# Patient Record
Sex: FEMALE | Race: WHITE | NOT HISPANIC OR LATINO | Employment: FULL TIME | URBAN - METROPOLITAN AREA
[De-identification: names, ages, dates, MRNs, and addresses within clinical notes are randomized per-mention and may not be internally consistent; named-entity substitution may affect disease eponyms.]

---
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
## 2024-02-15 ENCOUNTER — INITIAL PRENATAL (OUTPATIENT)
Dept: OBSTETRICS AND GYNECOLOGY | Facility: CLINIC | Age: 19
End: 2024-02-15
Payer: COMMERCIAL

## 2024-02-15 VITALS — WEIGHT: 158 LBS | SYSTOLIC BLOOD PRESSURE: 100 MMHG | BODY MASS INDEX: 25.5 KG/M2 | DIASTOLIC BLOOD PRESSURE: 60 MMHG

## 2024-02-15 DIAGNOSIS — O09.892 SHORT INTERVAL BETWEEN PREGNANCIES AFFECTING PREGNANCY IN SECOND TRIMESTER, ANTEPARTUM: ICD-10-CM

## 2024-02-15 DIAGNOSIS — Z34.82 ENCOUNTER FOR SUPERVISION OF OTHER NORMAL PREGNANCY IN SECOND TRIMESTER: Primary | ICD-10-CM

## 2024-02-15 DIAGNOSIS — O36.80X0 ENCOUNTER TO DETERMINE FETAL VIABILITY OF PREGNANCY, SINGLE OR UNSPECIFIED FETUS: ICD-10-CM

## 2024-02-15 RX ORDER — PNV NO.95/FERROUS FUM/FOLIC AC 28MG-0.8MG
1 TABLET ORAL DAILY
Qty: 90 TABLET | Refills: 3 | Status: SHIPPED | OUTPATIENT
Start: 2024-02-15

## 2024-02-20 LAB
ABO GROUP BLD: NORMAL
AMPHETAMINES UR QL SCN: NEGATIVE NG/ML
BARBITURATES UR QL SCN: NEGATIVE NG/ML
BASOPHILS # BLD AUTO: 0 X10E3/UL (ref 0–0.2)
BASOPHILS NFR BLD AUTO: 1 %
BENZODIAZ UR QL SCN: NEGATIVE NG/ML
BLD GP AB SCN SERPL QL: NEGATIVE
BZE UR QL SCN: NEGATIVE NG/ML
C TRACH RRNA SPEC QL NAA+PROBE: NEGATIVE
CANNABINOIDS UR QL SCN: NEGATIVE NG/ML
CFDNA.FET/CFDNA.TOTAL SFR FETUS: NORMAL %
CITATION REF LAB TEST: NORMAL
CREAT UR-MCNC: 191.6 MG/DL (ref 20–300)
EOSINOPHIL # BLD AUTO: 0 X10E3/UL (ref 0–0.4)
EOSINOPHIL NFR BLD AUTO: 1 %
ERYTHROCYTE [DISTWIDTH] IN BLOOD BY AUTOMATED COUNT: 14.4 % (ref 11.7–15.4)
FET 13+18+21+X+Y ANEUP PLAS.CFDNA: NEGATIVE
FET CHR 21 TS PLAS.CFDNA QL: NEGATIVE
FET MS X RISK WBC.DNA+CFDNA QL: NOT DETECTED
FET SEX PLAS.CFDNA DOSAGE CFDNA: NORMAL
FET TS 13 RISK PLAS.CFDNA QL: NEGATIVE
FET TS 18 RISK WBC.DNA+CFDNA QL: NEGATIVE
FET X + Y ANEUP RISK PLAS.CFDNA SEQ-IMP: NOT DETECTED
GA EST FROM CONCEPTION DATE: NORMAL D
GESTATIONAL AGE > 9:: YES
HBV SURFACE AG SERPL QL IA: NEGATIVE
HCT VFR BLD AUTO: 38.5 % (ref 34–46.6)
HCV IGG SERPL QL IA: NON REACTIVE
HGB BLD-MCNC: 12.7 G/DL (ref 11.1–15.9)
HIV 1+2 AB+HIV1 P24 AG SERPL QL IA: NON REACTIVE
IMM GRANULOCYTES # BLD AUTO: 0 X10E3/UL (ref 0–0.1)
IMM GRANULOCYTES NFR BLD AUTO: 0 %
LAB DIRECTOR NAME PROVIDER: NORMAL
LAB DIRECTOR NAME PROVIDER: NORMAL
LABORATORY COMMENT REPORT: NORMAL
LABORATORY COMMENT REPORT: NORMAL
LIMITATIONS OF THE TEST: NORMAL
LYMPHOCYTES # BLD AUTO: 1.5 X10E3/UL (ref 0.7–3.1)
LYMPHOCYTES NFR BLD AUTO: 26 %
MCH RBC QN AUTO: 28 PG (ref 26.6–33)
MCHC RBC AUTO-ENTMCNC: 33 G/DL (ref 31.5–35.7)
MCV RBC AUTO: 85 FL (ref 79–97)
METHADONE UR QL SCN: NEGATIVE NG/ML
MONOCYTES # BLD AUTO: 0.3 X10E3/UL (ref 0.1–0.9)
MONOCYTES NFR BLD AUTO: 6 %
N GONORRHOEA RRNA SPEC QL NAA+PROBE: NEGATIVE
NEGATIVE PREDICTIVE VALUE: NORMAL
NEUTROPHILS # BLD AUTO: 3.9 X10E3/UL (ref 1.4–7)
NEUTROPHILS NFR BLD AUTO: 66 %
NOTE: NORMAL
OPIATES UR QL SCN: NEGATIVE NG/ML
OXYCODONE+OXYMORPHONE UR QL SCN: NEGATIVE NG/ML
PCP UR QL: NEGATIVE NG/ML
PERFORMANCE CHARACTERISTICS: NORMAL
PH UR: 6 [PH] (ref 4.5–8.9)
PLATELET # BLD AUTO: 236 X10E3/UL (ref 150–450)
POSITIVE PREDICTIVE VALUE: NORMAL
PROPOXYPH UR QL SCN: NEGATIVE NG/ML
RBC # BLD AUTO: 4.53 X10E6/UL (ref 3.77–5.28)
REF LAB TEST METHOD: NORMAL
RH BLD: POSITIVE
RPR SER QL: NON REACTIVE
RUBV IGG SERPL IA-ACNC: 1.35 INDEX
T VAGINALIS RRNA SPEC QL NAA+PROBE: NEGATIVE
TEST PERFORMANCE INFO SPEC: NORMAL
WBC # BLD AUTO: 5.9 X10E3/UL (ref 3.4–10.8)

## 2024-03-13 ENCOUNTER — ROUTINE PRENATAL (OUTPATIENT)
Dept: OBSTETRICS AND GYNECOLOGY | Facility: CLINIC | Age: 19
End: 2024-03-13
Payer: COMMERCIAL

## 2024-03-13 VITALS — DIASTOLIC BLOOD PRESSURE: 68 MMHG | SYSTOLIC BLOOD PRESSURE: 114 MMHG | WEIGHT: 156 LBS | BODY MASS INDEX: 25.18 KG/M2

## 2024-03-13 DIAGNOSIS — Z34.92 PRENATAL CARE IN SECOND TRIMESTER: Primary | ICD-10-CM

## 2024-03-13 DIAGNOSIS — Z36.89 ENCOUNTER FOR FETAL ANATOMIC SURVEY: ICD-10-CM

## 2024-03-13 DIAGNOSIS — O09.899 SHORT INTERVAL BETWEEN PREGNANCIES AFFECTING PREGNANCY, ANTEPARTUM: ICD-10-CM

## 2024-03-13 NOTE — PROGRESS NOTES
Prenatal Care Visit    Subjective   Chief Complaint   Patient presents with    Routine Prenatal Visit     Anatomy scan, no complaints.       History:   Mallika is a  currently at 18w3d who presents for a prenatal care visit today.    No issues.    Social History    Tobacco Use      Smoking status: Never      Smokeless tobacco: Never       Objective   /68   Wt 70.8 kg (156 lb)   LMP 2023   BMI 25.18 kg/m²   Physical Exam:  Normal, gestational age-appropriate exam today        Plan   Medical Decision Making:    I have reviewed the prenatal labs and ultrasound(s) today. I have reviewed the most recent prenatal progress note(s).    Diagnosis: Supervision of high risk pregnancy  Short interval pregnancy   Tests/Orders/Rx today: Orders Placed This Encounter   Procedures    US Ob 14 + Weeks Single or First Gestation     Order Specific Question:   Reason for Exam:     Answer:   anatomy scan     Order Specific Question:   Release to patient     Answer:   Routine Release [[id number]]       Medication Management: none     Topics discussed: Prenatal care milestones  PIH precautions   labor signs and symptoms  U/S findings   Tests next visit: none   Next visit: 4 week(s)     Howard Kemp MD  Obstetrics and Gynecology  [facility]

## 2024-05-13 ENCOUNTER — ROUTINE PRENATAL (OUTPATIENT)
Dept: OBSTETRICS AND GYNECOLOGY | Facility: CLINIC | Age: 19
End: 2024-05-13
Payer: COMMERCIAL

## 2024-05-13 VITALS — SYSTOLIC BLOOD PRESSURE: 110 MMHG | WEIGHT: 162 LBS | BODY MASS INDEX: 26.15 KG/M2 | DIASTOLIC BLOOD PRESSURE: 70 MMHG

## 2024-05-13 DIAGNOSIS — O09.899 SHORT INTERVAL BETWEEN PREGNANCIES AFFECTING PREGNANCY, ANTEPARTUM: ICD-10-CM

## 2024-05-13 DIAGNOSIS — Z34.92 PRENATAL CARE IN SECOND TRIMESTER: Primary | ICD-10-CM

## 2024-05-13 PROCEDURE — 99213 OFFICE O/P EST LOW 20 MIN: CPT | Performed by: OBSTETRICS & GYNECOLOGY

## 2024-05-13 NOTE — PROGRESS NOTES
Prenatal Care Visit    Subjective   Chief Complaint   Patient presents with    Routine Prenatal Visit     No complaints       History:   Mallika is a  currently at 27w1d who presents for a prenatal care visit today.    No issues.    Social History    Tobacco Use      Smoking status: Never      Smokeless tobacco: Never       Objective   /70   Wt 73.5 kg (162 lb)   LMP 2023   BMI 26.15 kg/m²   Physical Exam:  Normal, gestational age-appropriate exam today        Plan   Medical Decision Making:    I have reviewed the prenatal labs and ultrasound(s) today. I have reviewed the most recent prenatal progress note(s).    Diagnosis: Supervision of high risk pregnancy  Short interval pregnancy   Tests/Orders/Rx today: No orders of the defined types were placed in this encounter.      Medication Management: none     Topics discussed: Prenatal care milestones  PIH precautions   labor signs and symptoms   Tests next visit: GCT  HgB   Next visit: 1 week(s)     Howard Kemp MD  Obstetrics and Gynecology  [facility]

## 2024-06-03 ENCOUNTER — ROUTINE PRENATAL (OUTPATIENT)
Dept: OBSTETRICS AND GYNECOLOGY | Facility: CLINIC | Age: 19
End: 2024-06-03
Payer: COMMERCIAL

## 2024-06-03 VITALS — WEIGHT: 163 LBS | BODY MASS INDEX: 26.31 KG/M2 | SYSTOLIC BLOOD PRESSURE: 116 MMHG | DIASTOLIC BLOOD PRESSURE: 70 MMHG

## 2024-06-03 DIAGNOSIS — Z34.83 ENCOUNTER FOR SUPERVISION OF OTHER NORMAL PREGNANCY IN THIRD TRIMESTER: Primary | ICD-10-CM

## 2024-06-03 DIAGNOSIS — O09.899 SHORT INTERVAL BETWEEN PREGNANCIES AFFECTING PREGNANCY, ANTEPARTUM: ICD-10-CM

## 2024-06-03 PROCEDURE — 99213 OFFICE O/P EST LOW 20 MIN: CPT | Performed by: MIDWIFE

## 2024-06-03 NOTE — PROGRESS NOTES
Chief Complaint   Patient presents with    Routine Prenatal Visit     Glucola today        HPI: Mallika is a  currently at 30w1d here for prenatal visit who reports the following: Baby is active. She denies any problems. She is doing glucola today.                EXAM:     Vitals:    24 1347   BP: 116/70      Abdomen:   See prenatal flowsheet as noted and reviewed, soft, nontender   Pelvic:  See prenatal flowsheet as noted and reviewed   Urine:  See prenatal flowsheet as noted and reviewed    Lab Results   Component Value Date    ABO O 02/15/2024    RH Positive 02/15/2024    ABSCRN Negative 02/15/2024       MDM:  Impression: Supervision of low risk pregnancy   Tests done today: GCT  HgB   Topics discussed: kick counts and fetal movement  Reviewed OB labs   Tests next visit: U/S for EFW                RTO:                        2 weeks    This note was electronically signed.  Ximena Payan, DAVIE  6/3/2024

## 2024-06-04 LAB
BASOPHILS # BLD AUTO: 0.03 10*3/MM3 (ref 0–0.2)
BASOPHILS NFR BLD AUTO: 0.5 % (ref 0–1.5)
EOSINOPHIL # BLD AUTO: 0.05 10*3/MM3 (ref 0–0.4)
EOSINOPHIL NFR BLD AUTO: 0.8 % (ref 0.3–6.2)
ERYTHROCYTE [DISTWIDTH] IN BLOOD BY AUTOMATED COUNT: 13.1 % (ref 12.3–15.4)
GLUCOSE 1H P 50 G GLC PO SERPL-MCNC: 121 MG/DL (ref 65–139)
HCT VFR BLD AUTO: 35.6 % (ref 34–46.6)
HGB BLD-MCNC: 11.6 G/DL (ref 12–15.9)
IMM GRANULOCYTES # BLD AUTO: 0.02 10*3/MM3 (ref 0–0.05)
IMM GRANULOCYTES NFR BLD AUTO: 0.3 % (ref 0–0.5)
LYMPHOCYTES # BLD AUTO: 1.75 10*3/MM3 (ref 0.7–3.1)
LYMPHOCYTES NFR BLD AUTO: 29.1 % (ref 19.6–45.3)
MCH RBC QN AUTO: 27.6 PG (ref 26.6–33)
MCHC RBC AUTO-ENTMCNC: 32.6 G/DL (ref 31.5–35.7)
MCV RBC AUTO: 84.8 FL (ref 79–97)
MONOCYTES # BLD AUTO: 0.32 10*3/MM3 (ref 0.1–0.9)
MONOCYTES NFR BLD AUTO: 5.3 % (ref 5–12)
NEUTROPHILS # BLD AUTO: 3.85 10*3/MM3 (ref 1.7–7)
NEUTROPHILS NFR BLD AUTO: 64 % (ref 42.7–76)
NRBC BLD AUTO-RTO: 0 /100 WBC (ref 0–0.2)
PLATELET # BLD AUTO: 220 10*3/MM3 (ref 140–450)
RBC # BLD AUTO: 4.2 10*6/MM3 (ref 3.77–5.28)
WBC # BLD AUTO: 6.02 10*3/MM3 (ref 3.4–10.8)

## 2024-06-17 ENCOUNTER — ROUTINE PRENATAL (OUTPATIENT)
Dept: OBSTETRICS AND GYNECOLOGY | Facility: CLINIC | Age: 19
End: 2024-06-17
Payer: OTHER MISCELLANEOUS

## 2024-06-17 VITALS — WEIGHT: 164.8 LBS | BODY MASS INDEX: 26.6 KG/M2 | DIASTOLIC BLOOD PRESSURE: 60 MMHG | SYSTOLIC BLOOD PRESSURE: 100 MMHG

## 2024-06-17 DIAGNOSIS — O09.899 SHORT INTERVAL BETWEEN PREGNANCIES AFFECTING PREGNANCY, ANTEPARTUM: Primary | ICD-10-CM

## 2024-06-17 DIAGNOSIS — Z36.89 ENCOUNTER FOR ULTRASOUND TO ASSESS FETAL GROWTH: ICD-10-CM

## 2024-06-17 PROCEDURE — 99213 OFFICE O/P EST LOW 20 MIN: CPT | Performed by: OBSTETRICS & GYNECOLOGY

## 2024-06-17 NOTE — PROGRESS NOTES
Chief Complaint   Patient presents with    Routine Prenatal Visit     Prenatal visit with growth scan done today. No problems or concerns        HPI:   , 32w1d gestation reports doing well    ROS:  See Prenatal Episode/Flowsheet  /60   Wt 74.8 kg (164 lb 12.8 oz)   LMP 2023   BMI 26.60 kg/m²      EXAM:  EXTREMITIES:  No swelling-See Prenatal Episode/Flowsheet    ABDOMEN:  FHTs/Movement noted-See Prenatal Episode/Flowsheet    URINE GLUCOSE/PROTEIN:  See Prenatal Episode/Flowsheet    PELVIC EXAM:  See Prenatal Episode/Flowsheet  CV:  Lungs:  GYN:    MDM:    Lab Results   Component Value Date    HGB 11.6 (L) 2024    RUBELLAABIGG 1.35 02/15/2024    HEPBSAG Negative 02/15/2024    ABO O 02/15/2024    RH Positive 02/15/2024    ABSCRN Negative 02/15/2024    UAN6COC9 Non Reactive 02/15/2024    HEPCVIRUSABY Non Reactive 02/15/2024       U/S: Overall growth 43.4 percentile.  MARISOL 14.49.  Vertex.  Posterior placenta.  Mild pyelectasis 6.1 mm on the right and 10 mm on the left  US Ob 14 + Weeks Single or First Gestation (2024 11:37)   1. IUP 32w1d  2. Routine care   3.  Pyelectasis.  Repeat check 36 weeks  4. Repeat CBC next time

## 2024-12-11 ENCOUNTER — TELEPHONE (OUTPATIENT)
Dept: OBSTETRICS AND GYNECOLOGY | Facility: CLINIC | Age: 19
End: 2024-12-11
Payer: COMMERCIAL

## 2024-12-11 DIAGNOSIS — Z32.00 POSSIBLE PREGNANCY, NOT CONFIRMED: Primary | ICD-10-CM
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
## 2024-12-12 NOTE — TELEPHONE ENCOUNTER
Left message for pt to return call and left message with pt mother who made the initial call to have her contact the office.

## 2024-12-15 ENCOUNTER — HOSPITAL ENCOUNTER (EMERGENCY)
Facility: HOSPITAL | Age: 19
Discharge: HOME | End: 2024-12-15
Attending: EMERGENCY MEDICINE | Admitting: EMERGENCY MEDICINE
Payer: COMMERCIAL

## 2024-12-15 ENCOUNTER — APPOINTMENT (OUTPATIENT)
Dept: ULTRASOUND IMAGING | Facility: HOSPITAL | Age: 19
End: 2024-12-15
Payer: COMMERCIAL

## 2024-12-15 VITALS
HEIGHT: 66 IN | HEART RATE: 71 BPM | OXYGEN SATURATION: 97 % | DIASTOLIC BLOOD PRESSURE: 80 MMHG | WEIGHT: 150 LBS | TEMPERATURE: 98 F | SYSTOLIC BLOOD PRESSURE: 119 MMHG | RESPIRATION RATE: 17 BRPM | BODY MASS INDEX: 24.11 KG/M2

## 2024-12-15 DIAGNOSIS — O26.899 ABDOMINAL PAIN AFFECTING PREGNANCY: Primary | ICD-10-CM

## 2024-12-15 DIAGNOSIS — R10.9 ABDOMINAL PAIN AFFECTING PREGNANCY: Primary | ICD-10-CM

## 2024-12-15 DIAGNOSIS — R30.0 DYSURIA: ICD-10-CM

## 2024-12-15 LAB
ALBUMIN SERPL-MCNC: 4.1 G/DL (ref 3.5–5.2)
ALBUMIN/GLOB SERPL: 1.5 G/DL
ALP SERPL-CCNC: 62 U/L (ref 39–117)
ALT SERPL W P-5'-P-CCNC: 8 U/L (ref 1–33)
ANION GAP SERPL CALCULATED.3IONS-SCNC: 14.3 MMOL/L (ref 5–15)
AST SERPL-CCNC: 12 U/L (ref 1–32)
BACTERIA UR QL AUTO: ABNORMAL /HPF
BASOPHILS # BLD AUTO: 0.03 10*3/MM3 (ref 0–0.2)
BASOPHILS NFR BLD AUTO: 0.4 % (ref 0–1.5)
BILIRUB SERPL-MCNC: <0.2 MG/DL (ref 0–1.2)
BILIRUB UR QL STRIP: NEGATIVE
BUN SERPL-MCNC: 8 MG/DL (ref 6–20)
BUN/CREAT SERPL: 11.6 (ref 7–25)
CALCIUM SPEC-SCNC: 9.4 MG/DL (ref 8.6–10.5)
CHLORIDE SERPL-SCNC: 102 MMOL/L (ref 98–107)
CLARITY UR: ABNORMAL
CO2 SERPL-SCNC: 22.7 MMOL/L (ref 22–29)
COLOR UR: YELLOW
CREAT SERPL-MCNC: 0.69 MG/DL (ref 0.57–1)
DEPRECATED RDW RBC AUTO: 43.2 FL (ref 37–54)
EGFRCR SERPLBLD CKD-EPI 2021: 127.6 ML/MIN/1.73
EOSINOPHIL # BLD AUTO: 0.05 10*3/MM3 (ref 0–0.4)
EOSINOPHIL NFR BLD AUTO: 0.7 % (ref 0.3–6.2)
ERYTHROCYTE [DISTWIDTH] IN BLOOD BY AUTOMATED COUNT: 14 % (ref 12.3–15.4)
GLOBULIN UR ELPH-MCNC: 2.8 GM/DL
GLUCOSE SERPL-MCNC: 69 MG/DL (ref 65–99)
GLUCOSE UR STRIP-MCNC: NEGATIVE MG/DL
HCG INTACT+B SERPL-ACNC: 5702 MIU/ML
HCT VFR BLD AUTO: 38.6 % (ref 34–46.6)
HGB BLD-MCNC: 12.9 G/DL (ref 12–15.9)
HGB UR QL STRIP.AUTO: NEGATIVE
HYALINE CASTS UR QL AUTO: ABNORMAL /LPF
IMM GRANULOCYTES # BLD AUTO: 0.02 10*3/MM3 (ref 0–0.05)
IMM GRANULOCYTES NFR BLD AUTO: 0.3 % (ref 0–0.5)
KETONES UR QL STRIP: NEGATIVE
LEUKOCYTE ESTERASE UR QL STRIP.AUTO: ABNORMAL
LYMPHOCYTES # BLD AUTO: 2.32 10*3/MM3 (ref 0.7–3.1)
LYMPHOCYTES NFR BLD AUTO: 33.9 % (ref 19.6–45.3)
MCH RBC QN AUTO: 28.6 PG (ref 26.6–33)
MCHC RBC AUTO-ENTMCNC: 33.4 G/DL (ref 31.5–35.7)
MCV RBC AUTO: 85.6 FL (ref 79–97)
MONOCYTES # BLD AUTO: 0.45 10*3/MM3 (ref 0.1–0.9)
MONOCYTES NFR BLD AUTO: 6.6 % (ref 5–12)
NEUTROPHILS NFR BLD AUTO: 3.97 10*3/MM3 (ref 1.7–7)
NEUTROPHILS NFR BLD AUTO: 58.1 % (ref 42.7–76)
NITRITE UR QL STRIP: NEGATIVE
NRBC BLD AUTO-RTO: 0 /100 WBC (ref 0–0.2)
PH UR STRIP.AUTO: 7 [PH] (ref 5–8)
PLATELET # BLD AUTO: 217 10*3/MM3 (ref 140–450)
PMV BLD AUTO: 9.6 FL (ref 6–12)
POTASSIUM SERPL-SCNC: 4 MMOL/L (ref 3.5–5.2)
PROT SERPL-MCNC: 6.9 G/DL (ref 6–8.5)
PROT UR QL STRIP: NEGATIVE
RBC # BLD AUTO: 4.51 10*6/MM3 (ref 3.77–5.28)
RBC # UR STRIP: ABNORMAL /HPF
REF LAB TEST METHOD: ABNORMAL
SODIUM SERPL-SCNC: 139 MMOL/L (ref 136–145)
SP GR UR STRIP: 1.02 (ref 1–1.03)
SQUAMOUS #/AREA URNS HPF: ABNORMAL /HPF
UROBILINOGEN UR QL STRIP: ABNORMAL
WBC # UR STRIP: ABNORMAL /HPF
WBC NRBC COR # BLD AUTO: 6.84 10*3/MM3 (ref 3.4–10.8)

## 2024-12-15 PROCEDURE — 80053 COMPREHEN METABOLIC PANEL: CPT | Performed by: PHYSICIAN ASSISTANT

## 2024-12-15 PROCEDURE — 76805 OB US >/= 14 WKS SNGL FETUS: CPT

## 2024-12-15 PROCEDURE — 85025 COMPLETE CBC W/AUTO DIFF WBC: CPT | Performed by: PHYSICIAN ASSISTANT

## 2024-12-15 PROCEDURE — 76817 TRANSVAGINAL US OBSTETRIC: CPT

## 2024-12-15 PROCEDURE — 36415 COLL VENOUS BLD VENIPUNCTURE: CPT

## 2024-12-15 PROCEDURE — 84702 CHORIONIC GONADOTROPIN TEST: CPT | Performed by: PHYSICIAN ASSISTANT

## 2024-12-15 PROCEDURE — 81001 URINALYSIS AUTO W/SCOPE: CPT | Performed by: PHYSICIAN ASSISTANT

## 2024-12-15 PROCEDURE — 87086 URINE CULTURE/COLONY COUNT: CPT | Performed by: PHYSICIAN ASSISTANT

## 2024-12-15 PROCEDURE — 99284 EMERGENCY DEPT VISIT MOD MDM: CPT | Performed by: EMERGENCY MEDICINE

## 2024-12-15 RX ORDER — CEPHALEXIN 500 MG/1
500 CAPSULE ORAL 3 TIMES DAILY
Qty: 21 CAPSULE | Refills: 0 | Status: SHIPPED | OUTPATIENT
Start: 2024-12-15 | End: 2024-12-20

## 2024-12-15 RX ADMIN — CEPHALEXIN 500 MG: 250 CAPSULE ORAL at 21:18

## 2024-12-15 NOTE — ED PROVIDER NOTES
" EMERGENCY DEPARTMENT ENCOUNTER    Pt Name: Mallika Kimble  MRN: [id number]  Pt :   2005  Room Number:  1414  Date of encounter:  12/15/2024  PCP: Provider, No Known  ED Provider: George Walton PA-C    Historian: Patient      HPI:  Chief Complaint   Patient presents with    Abdominal Pain          Context: Mallika Kimble is a 19 y.o. female who presents to the ED c/o lower abdominal pressure for 3 weeks, dysuria and urinary frequency for 1 week.  No vomiting.  Normal bowel habits.  Just recently found out she was pregnant.   with 2 vaginal deliveries prior.  First at 37 weeks most recent 6 months ago at 32 weeks.  Denies vaginal bleeding.  No vaginal discharge.  Has not had IUP confirmed yet.      PAST MEDICAL HISTORY  History reviewed. No pertinent past medical history.      PAST SURGICAL HISTORY  Past Surgical History:   Procedure Laterality Date    APPENDECTOMY  2019    TONSILLECTOMY           FAMILY HISTORY  Family History   Problem Relation Age of Onset    Diabetes Mother          SOCIAL HISTORY  Social History     Socioeconomic History    Marital status:    Tobacco Use    Smoking status: Never    Smokeless tobacco: Never   Substance and Sexual Activity    Alcohol use: Never    Drug use: Never    Sexual activity: Yes     Partners: Male     Birth control/protection: None         ALLERGIES  Patient has no known allergies.        REVIEW OF SYSTEMS  Review of Systems   Constitutional: Negative.  Positive for fatigue.   HENT: Negative.     Eyes: Negative.    Respiratory: Negative.     Cardiovascular: Negative.    Gastrointestinal: Negative.  Negative for constipation, diarrhea, nausea and vomiting.        Lower abdominal pressure   Genitourinary: Negative.  Positive for dysuria and frequency. Negative for vaginal bleeding and vaginal discharge.        \"Butterflies in the right side\"   Musculoskeletal: Negative.    Skin: Negative.    Neurological: Negative.  "   Psychiatric/Behavioral: Negative.          All systems reviewed and negative except for those discussed in HPI.       PHYSICAL EXAM    I have reviewed the triage vital signs and nursing notes.    ED Triage Vitals [12/15/24 1658]   Temp Heart Rate Resp BP SpO2   97.9 °F (36.6 °C) 72 16 122/74 97 %      Temp src Heart Rate Source Patient Position BP Location FiO2 (%)   Oral Monitor Sitting Left arm --       Physical Exam  Vitals and nursing note reviewed.   Constitutional:       General: She is not in acute distress.     Appearance: She is well-developed and normal weight. She is not ill-appearing, toxic-appearing or diaphoretic.   HENT:      Head: Normocephalic and atraumatic.   Cardiovascular:      Rate and Rhythm: Normal rate.   Pulmonary:      Effort: Pulmonary effort is normal.   Abdominal:      General: Abdomen is flat.      Palpations: Abdomen is soft.      Tenderness: There is abdominal tenderness in the suprapubic area. There is no right CVA tenderness, left CVA tenderness, guarding or rebound.   Skin:     General: Skin is warm and dry.   Neurological:      General: No focal deficit present.      Mental Status: She is alert.   Psychiatric:         Mood and Affect: Mood normal.         Behavior: Behavior normal.            LAB RESULTS  Recent Results (from the past 24 hours)   hCG, Quantitative, Pregnancy    Collection Time: 12/15/24  5:15 PM    Specimen: Blood   Result Value Ref Range    HCG Quantitative 5,702.00 mIU/mL   Comprehensive Metabolic Panel    Collection Time: 12/15/24  5:15 PM    Specimen: Blood   Result Value Ref Range    Glucose 69 65 - 99 mg/dL    BUN 8 6 - 20 mg/dL    Creatinine 0.69 0.57 - 1.00 mg/dL    Sodium 139 136 - 145 mmol/L    Potassium 4.0 3.5 - 5.2 mmol/L    Chloride 102 98 - 107 mmol/L    CO2 22.7 22.0 - 29.0 mmol/L    Calcium 9.4 8.6 - 10.5 mg/dL    Total Protein 6.9 6.0 - 8.5 g/dL    Albumin 4.1 3.5 - 5.2 g/dL    ALT (SGPT) 8 1 - 33 U/L    AST (SGOT) 12 1 - 32 U/L    Alkaline  Phosphatase 62 39 - 117 U/L    Total Bilirubin <0.2 0.0 - 1.2 mg/dL    Globulin 2.8 gm/dL    A/G Ratio 1.5 g/dL    BUN/Creatinine Ratio 11.6 7.0 - 25.0    Anion Gap 14.3 5.0 - 15.0 mmol/L    eGFR 127.6 >60.0 mL/min/1.73   CBC Auto Differential    Collection Time: 12/15/24  5:15 PM    Specimen: Blood   Result Value Ref Range    WBC 6.84 3.40 - 10.80 10*3/mm3    RBC 4.51 3.77 - 5.28 10*6/mm3    Hemoglobin 12.9 12.0 - 15.9 g/dL    Hematocrit 38.6 34.0 - 46.6 %    MCV 85.6 79.0 - 97.0 fL    MCH 28.6 26.6 - 33.0 pg    MCHC 33.4 31.5 - 35.7 g/dL    RDW 14.0 12.3 - 15.4 %    RDW-SD 43.2 37.0 - 54.0 fl    MPV 9.6 6.0 - 12.0 fL    Platelets 217 140 - 450 10*3/mm3    Neutrophil % 58.1 42.7 - 76.0 %    Lymphocyte % 33.9 19.6 - 45.3 %    Monocyte % 6.6 5.0 - 12.0 %    Eosinophil % 0.7 0.3 - 6.2 %    Basophil % 0.4 0.0 - 1.5 %    Immature Grans % 0.3 0.0 - 0.5 %    Neutrophils, Absolute 3.97 1.70 - 7.00 10*3/mm3    Lymphocytes, Absolute 2.32 0.70 - 3.10 10*3/mm3    Monocytes, Absolute 0.45 0.10 - 0.90 10*3/mm3    Eosinophils, Absolute 0.05 0.00 - 0.40 10*3/mm3    Basophils, Absolute 0.03 0.00 - 0.20 10*3/mm3    Immature Grans, Absolute 0.02 0.00 - 0.05 10*3/mm3    nRBC 0.0 0.0 - 0.2 /100 WBC   Urinalysis With Culture If Indicated - Urine, Clean Catch    Collection Time: 12/15/24  5:29 PM    Specimen: Urine, Clean Catch   Result Value Ref Range    Color, UA Yellow Yellow, Straw    Appearance, UA Turbid (A) Clear    pH, UA 7.0 5.0 - 8.0    Specific Gravity, UA 1.017 1.005 - 1.030    Glucose, UA Negative Negative    Ketones, UA Negative Negative    Bilirubin, UA Negative Negative    Blood, UA Negative Negative    Protein, UA Negative Negative    Leuk Esterase, UA Large (3+) (A) Negative    Nitrite, UA Negative Negative    Urobilinogen, UA 0.2 E.U./dL 0.2 - 1.0 E.U./dL   Urinalysis, Microscopic Only - Urine, Clean Catch    Collection Time: 12/15/24  5:29 PM    Specimen: Urine, Clean Catch   Result Value Ref Range    RBC, UA None  Seen None Seen, 0-2 /HPF    WBC, UA 0-2 None Seen, 0-2 /HPF    Bacteria, UA None Seen None Seen /HPF    Squamous Epithelial Cells, UA 13-20 (A) None Seen, 0-2 /HPF    Hyaline Casts, UA None Seen None Seen /LPF    Methodology Manual Light Microscopy        If labs were ordered, I independently reviewed the results and considered them in treating the patient.        RADIOLOGY  US Ob 14 + Weeks Single or First Gestation    Result Date: 12/15/2024  FINAL REPORT TECHNIQUE: null CLINICAL HISTORY: Lower abdominal pain, pregnant, no IUP confirmed this pregnancy unsure of LMP COMPARISON: null FINDINGS: Exam: Obstetrical ultrasound Comparison: None Clinical history: Lower abdominal pain, pregnant. Unsure of LMP Findings: Real time sonographic imaging, including color-flow imaging, was performed by the sonographer. Multiple representative static images were saved for review. Single live intrauterine gestation. Placenta is posterior. No demonstrated evidence of previa or abruption. Cervical length not measured. MARISOL: Not measured FHR: 163 bpm Biometrics: BPD: 18 weeks 4 days HC: 18 weeks 1 day AC: 18 weeks 0 days FL: 17 weeks 4 days EFW: 207 g Right ovary measures 2.6 x 1.6 x 2.0 cm and has a normal appearance. Left ovary measures 2.5 x 1.5 x 2.6 cm and has a normal appearance. Color and spectral flow is seen to both ovaries and no ovarian torsion is identified at this time.     Impression: 1. Single living intrauterine fetus estimated at 18 weeks 1 day by today's ultrasound criteria with an EULOGIO of 05/17/2025. Authenticated and Electronically Signed by Meena Washington MD on 12/15/2024 08:47:26 PM         PROCEDURES    Procedures    Interpretations    O2 Sat: The patients oxygen saturation was 97% on Room Air.  This was independently interpreted by me as Normal    Radiology: I ordered and independently reviewed the above noted radiographic studies.  I viewed images of Pelvic Ultrasound which showed Intrauterine Pregnancy per my  independent interpretation. See radiologist's dictation for official interpretation.     MEDICATIONS GIVEN IN ER    Medications   cephalexin (KEFLEX) capsule 500 mg (has no administration in time range)         MEDICAL DECISION MAKING, PROGRESS, and CONSULTS    All labs, if obtained, have been independently reviewed by me.  All radiology studies, if obtained, have been reviewed by me and the radiologist dictating the report.  All EKG's, if obtained, have been independently viewed and interpreted by me      Discussion below represents my analysis of pertinent findings related to patient's condition, differential diagnosis, treatment plan and final disposition.      Differential diagnosis:    UTI, intrauterine pregnancy, ectopic pregnancy, threatened miscarriage    Additional Sources:  None      Orders placed during this visit:  Orders Placed This Encounter   Procedures    Urine Culture - Urine,    US Ob 14 + Weeks Single or First Gestation    Urinalysis With Culture If Indicated - Urine, Clean Catch    hCG, Quantitative, Pregnancy    Comprehensive Metabolic Panel    CBC Auto Differential    Urinalysis, Microscopic Only - Urine, Clean Catch    Ambulatory Referral to Obstetrics / Gynecology    CBC & Differential         Additional orders considered but not ordered:  None    ED Course:    Consultants:  None    ED Course as of 12/15/24 2107   Sun Dec 15, 2024   1733 CBC & Differential [TM]   1743 Leukocytes, UA(!): Large (3+) [TM]   1807 RBC, UA: None Seen [TM]   1808 WBC, UA: 0-2 [TM]   1808 Bacteria, UA: None Seen [TM]   1808 Squamous Epithelial Cells, UA(!): 13-20 [TM]   1808 Nitrite, UA: Negative [TM]   1808 Comprehensive Metabolic Panel [TM]   1808 BUN: 8 [TM]   1808 Creatinine: 0.69 [TM]   1808 ALT (SGPT): 8 [TM]   1808 AST (SGOT): 12 [TM]   1808 Anion Gap: 14.3 [TM]   1915 HCG Quantitative: 5,702.00 [TM]   2101 Patient denies contractions, and again no vaginal bleeding or discharge.  Given large leukocytes but no  nitrites no white blood cells and no bacteria in the urine given pregnant state will culture urine and treat with Keflex.  She is 18 weeks 1 day pregnant.  She wishes to follow-up with [facility].  Discussed the case with Dr. Patel, patient is hemodynamically stable no indication for emergent OBG/GYN follow-up however will reach out to Dr. Miller in [facility] to help arrange close follow-up.  Strict return to care precautions given. [TM]      ED Course User Index  [TM] George Walton PA-C           After my consideration of clinical presentation and any laboratory/radiology studies obtained, I discussed the findings with the patient/patient representative who is in agreement with the treatment plan and the final disposition. Risks and benefits of discharge were discussed.     AS OF 21:07 EST VITALS:    BP - 113/79  HR - 70  TEMP - 97.9 °F (36.6 °C) (Oral)  O2 SATS - 99%    I reviewed the patients prescription monitoring report if available prior to discharge    DIAGNOSIS  Final diagnoses:   Abdominal pain affecting pregnancy   Dysuria         DISPOSITION  ED Disposition       ED Disposition   Discharge    Condition   Stable    Comment   --                   Please note that portions of this document were completed with voice recognition software.        George Walton PA-C  12/15/24 0827

## 2024-12-15 NOTE — Clinical Note
[facility] EMERGENCY DEPARTMENT  [address] [address]  Phone: [phone]    Maximo Kimble accompanied Mallika Kimble to the emergency department on 12/15/2024. They may return to work on 12/16/2024.        Thank you for choosing [facility].    George Walton PA-C

## 2024-12-16 LAB — BACTERIA SPEC AEROBE CULT: NORMAL

## 2024-12-16 NOTE — DISCHARGE INSTRUCTIONS
We reached out to Dr. Miller on your behalf and sent a referral to her office.  They should reach out to you beginning of this week but we recommend you call first thing Monday morning to arrange for very close outpatient follow-up.  Please also sent you some antibiotics into the pharmacy and have sent a culture for your urine to determine if there is any infection in it.  We recommend pelvic rest with no insertion of anything into the vagina.  If you develop any vaginal bleeding or worsening pain or symptoms please return to the ER immediately.

## 2024-12-20 ENCOUNTER — INITIAL PRENATAL (OUTPATIENT)
Dept: OBSTETRICS AND GYNECOLOGY | Facility: CLINIC | Age: 19
End: 2024-12-20
Payer: COMMERCIAL

## 2024-12-20 VITALS — BODY MASS INDEX: 25.44 KG/M2 | WEIGHT: 157.6 LBS | SYSTOLIC BLOOD PRESSURE: 112 MMHG | DIASTOLIC BLOOD PRESSURE: 70 MMHG

## 2024-12-20 DIAGNOSIS — O09.899 SHORT INTERVAL BETWEEN PREGNANCIES AFFECTING PREGNANCY, ANTEPARTUM: ICD-10-CM

## 2024-12-20 DIAGNOSIS — Z34.82 PRENATAL CARE, SUBSEQUENT PREGNANCY IN SECOND TRIMESTER: Primary | ICD-10-CM

## 2024-12-20 DIAGNOSIS — O09.892 HISTORY OF PRETERM DELIVERY, CURRENTLY PREGNANT IN SECOND TRIMESTER: ICD-10-CM

## 2024-12-20 LAB
AMPHET+METHAMPHET UR QL: NEGATIVE
AMPHETAMINES UR QL: NEGATIVE
BARBITURATES UR QL SCN: NEGATIVE
BENZODIAZ UR QL SCN: NEGATIVE
BUPRENORPHINE SERPL-MCNC: NEGATIVE NG/ML
CANNABINOIDS SERPL QL: NEGATIVE
COCAINE UR QL: NEGATIVE
FENTANYL UR-MCNC: NEGATIVE NG/ML
METHADONE UR QL SCN: NEGATIVE
OPIATES UR QL: NEGATIVE
OXYCODONE UR QL SCN: NEGATIVE
PCP UR QL SCN: NEGATIVE
TRICYCLICS UR QL SCN: NEGATIVE

## 2024-12-20 PROCEDURE — 87086 URINE CULTURE/COLONY COUNT: CPT | Performed by: OBSTETRICS & GYNECOLOGY

## 2024-12-20 PROCEDURE — 80307 DRUG TEST PRSMV CHEM ANLYZR: CPT | Performed by: OBSTETRICS & GYNECOLOGY

## 2024-12-20 RX ORDER — PROGESTERONE 200 MG/1
CAPSULE ORAL
Qty: 90 CAPSULE | Refills: 3 | Status: SHIPPED | OUTPATIENT
Start: 2024-12-20

## 2024-12-20 NOTE — PROGRESS NOTES
Subjective   Chief Complaint   Patient presents with    Initial Prenatal Visit     Us done today / no c/c       Mallika Kimble is a 19 y.o. year old .  Patient's last menstrual period was 10/10/2023 (approximate).  She presents to be seen to initiate prenatal care. She reports not seeing anyone else this pregnancy. She last had a baby in  of this year at 32 weeks.     Social History    Tobacco Use      Smoking status: Never      Smokeless tobacco: Never      The following portions of the patient's history were reviewed and updated as appropriate:vital signs, allergies, current medications, past family history, past medical history, past social history, past surgical history, and problem list.    Objective   /70   Wt 71.5 kg (157 lb 9.6 oz)   LMP 10/10/2023 (Approximate)   BMI 25.44 kg/m²     General: well developed; well nourished  no acute distress  mentation appropriate   Skin: Not performed.   Thyroid: not examined   Heart:  Not performed.   Lungs: breathing is unlabored   Breasts:  Not performed.   Abdomen: Not performed.   Pelvis: Not performed.       Lab Review   No data reviewed    Imaging   Pelvic ultrasound report    Assessment & Plan   ASSESSMENT  19 y.o. year old  at 18w5d set by ultrasound today   Supervision of high risk pregnancy  Hx of  delivery in prior pregnancy   Short interval pregnancy     PLAN  The problem list for pregnancy was initiated today  Tests ordered today:  Orders Placed This Encounter   Procedures    Chlamydia trachomatis, Neisseria gonorrhoeae, Trichomonas vaginalis, PCR - Swab, Cervix     Standing Status:   Future     Number of Occurrences:   1     Standing Expiration Date:   2025     Order Specific Question:   Release to patient     Answer:   Routine Release [[id number]]    Urine Culture - Urine, Urine, Clean Catch     Standing Status:   Future     Number of Occurrences:   1     Standing Expiration Date:   2025     Order Specific  Question:   Release to patient     Answer:   Routine Release [[id number]]    [facility]     Standing Status:   Future     Standing Expiration Date:   2025     Order Specific Question:   Reason for Exam:     Answer:   history of 32 week delivery, routine screening, CL     Order Specific Question:   Release to patient     Answer:   Routine Release [[id number]]    OB Panel With HIV and RPR     Standing Status:   Future     Standing Expiration Date:   2025     Order Specific Question:   Release to patient     Answer:   Routine Release [[id number]]    Hepatitis C Antibody     Standing Status:   Future     Standing Expiration Date:   2025     Order Specific Question:   Release to patient     Answer:   Routine Release [[id number]]    Urine Drug Screen - Urine, Clean Catch     Please confirm all positive UDS     Standing Status:   Future     Number of Occurrences:   1     Standing Expiration Date:   2025     Order Specific Question:   Release to patient     Answer:   Routine Release [[id number]]    Julieta Panorama Prenatal Test: Chromosomes 13, 18, 21, X & Y: Triploidy 22Q.11.2 Deletion - Blood,     ==========Department Information==========    ID: [id number]    Department:[facility]  [facility]  [address] [place] [address]  Dept: [phone]  Dept Fax: [phone]  Loc: [phone]  Loc Fax: [phone]       Standing Status:   Future     Number of Occurrences:   1     Standing Expiration Date:   2025     Order Specific Question:   Is patient pregnant?     Answer:   Yes     Order Specific Question:   Expected due date (MM/DD/YYYY):     Answer:   2025     Order Specific Question:   Is this a twin pregnancy? (viable, no vanished twin)     Answer:   No     Order Specific Question:   Is this a surrogate or egg donor pregnancy?     Answer:   No     Order Specific Question:   I want fetal sex included in  the report:     Answer:   Yes     Order Specific Question:   Patient's weight (lbs):     Answer:   157     Order Specific Question:   Opt out of 22q11.2?     Answer:   No     Order Specific Question:   Enroll this patient in the Automatic Redraw Program?     Answer:   No     Order Specific Question:   What type of billing?     Answer:   Bill Insurance     Order Specific Question:   Did patient sign the Patient Acknowledgement?     Answer:   Yes     Order Specific Question:   Did the ordering clinician sign the Statement of Informed Consent?     Answer:   Yes     Order Specific Question:   Blood draw managed by Julieta?     Answer:   No     Order Specific Question:   Release to patient     Answer:   Routine Release [[id number]]     Testing for GC / Chlamydia / trichomonas was done today  Genetic testing reviewed: NIPT ([facility]) and they have already decided to have testing performed.  She reports she has already had carrier screening done in the past and it was normal for CF and SMA.   Pap was not done today.  I explained to Mallika that the recommendations for Pap smears are to start doing PAP smears at 20 years of age.  I told Mallika she still needs to be seen in our office yearly for an annual visit.  Information reviewed: exercise in pregnancy, nutrition in pregnancy, weight gain in pregnancy, work and travel restrictions during pregnancy, list of OTC medications acceptable in pregnancy, and call coverage groups  Reviewed recommendation for influenza and COVID-19 booster vaccinations in pregnancy   Reviewed recommendation for vaginal progesterone 200 mg nightly per vagina and CL ultrasound every 4 weeks.     Total time spent today with Mallika  was 45 minutes (level 4).  Of this time, > 50% was spent face-to-face time coordinating care, answering her questions and counseling regarding pathophysiology of her presenting problem along with plans for any diagnositc work-up and treatment.    Follow up: 2 week(s)        This note was electronically signed.    Beth Rodríguez MD  December 20, 2024

## 2024-12-22 LAB — BACTERIA SPEC AEROBE CULT: ABNORMAL

## 2024-12-23 ENCOUNTER — PATIENT ROUNDING (BHMG ONLY) (OUTPATIENT)
Dept: OBSTETRICS AND GYNECOLOGY | Facility: CLINIC | Age: 19
End: 2024-12-23
Payer: COMMERCIAL

## 2024-12-23 NOTE — PROGRESS NOTES
My name is NitzaJAIME    I am with NEIL MELGAR  [facility]  [address]  [place] [address] [phone]    I want to officially welcome you to our practice and ask about your recent visit.    Tell me about your visit with us.  What things went well?    We're always looking for ways to make our patients' experiences even better.  Do you have recommendations on ways we may improve?    Overall were you satisfied with your first visit to our practice?    I appreciate you taking the time to answer a few questions today.  Is there anything else I can do for you?    Thank you, and have a great day.

## 2024-12-26 ENCOUNTER — REFERRAL TRIAGE (OUTPATIENT)
Dept: LABOR AND DELIVERY | Facility: HOSPITAL | Age: 19
End: 2024-12-26
Payer: COMMERCIAL

## 2024-12-26 PROBLEM — O23.42 URINARY TRACT INFECTION IN MOTHER DURING SECOND TRIMESTER OF PREGNANCY: Status: ACTIVE | Noted: 2024-12-26

## 2024-12-26 RX ORDER — CEPHALEXIN 500 MG/1
500 CAPSULE ORAL 4 TIMES DAILY
Qty: 28 CAPSULE | Refills: 0 | Status: SHIPPED | OUTPATIENT
Start: 2024-12-26 | End: 2025-01-02

## 2025-01-02 ENCOUNTER — PATIENT OUTREACH (OUTPATIENT)
Dept: LABOR AND DELIVERY | Facility: HOSPITAL | Age: 20
End: 2025-01-02
Payer: COMMERCIAL

## 2025-01-02 NOTE — OUTREACH NOTE
[facility]  Unable to Reach    Questions/Answers      Flowsheet Row Responses   Pending Outreach Confirm Patient Interest   Call Attempt First   Outcome No answer/busy   Unable to reach comments: VM not set up. Will plan attempt to contact next week.            Kennedi Murguia RN  Maternity Nurse Navigator    1/2/2025, 11:45 EST

## 2025-01-10 ENCOUNTER — PATIENT OUTREACH (OUTPATIENT)
Dept: LABOR AND DELIVERY | Facility: HOSPITAL | Age: 20
End: 2025-01-10

## 2025-01-10 NOTE — OUTREACH NOTE
[facility]  Unable to Reach    Questions/Answers      Flowsheet Row Responses   Pending Outreach Confirm Patient Interest   Call Attempt Second   Outcome No answer/busy   Next Call Attempt Date 01/14/25   Unable to reach comments: VM not set up. Will plan attempt to contact next week.            Kennedi Murguia RN  Maternity Nurse Navigator    1/10/2025, 10:01 EST

## 2025-01-14 ENCOUNTER — PATIENT OUTREACH (OUTPATIENT)
Dept: LABOR AND DELIVERY | Facility: HOSPITAL | Age: 20
End: 2025-01-14

## 2025-01-14 NOTE — OUTREACH NOTE
[facility]  Unable to Reach    Questions/Answers      Flowsheet Row Responses   Pending Outreach Confirm Patient Interest   Call Attempt Third   Outcome No answer/busy, [employer] message sent to patient   Unable to reach comments: VM not set up.          After three attempts to contact Mallika were unsuccessful, will decline from program. Basic pregnancy resources and information sent to her [employer] account.     Kennedi Murguia RN  Maternity Nurse Navigator    1/14/2025, 09:19 EST     Initiate Treatment: Doxycycline 100mg- bid for two months and once a day for month \\n\\nTretinion 0.1%- apply to the affected areas once daily at night \\n\\nClindamycin 1%- apply to the affected areas 2x daily Continue Regimen: Taking the Doxycycline 100mg twice a day\\n\\nApplying the Clindamycin to acne prone areas twice a day and Tretinion 0.1% to face at bedtime  \\n\\n Otc Regimen: Neutrogena combination skin Detail Level: Zone

## 2025-03-06 ENCOUNTER — TELEPHONE (OUTPATIENT)
Dept: OBSTETRICS AND GYNECOLOGY | Facility: CLINIC | Age: 20
End: 2025-03-06

## 2025-03-06 PROBLEM — O09.893 HISTORY OF PRETERM DELIVERY, CURRENTLY PREGNANT IN THIRD TRIMESTER: Status: ACTIVE | Noted: 2024-12-20

## 2025-03-06 PROBLEM — Z34.83 PRENATAL CARE, SUBSEQUENT PREGNANCY IN THIRD TRIMESTER: Status: ACTIVE | Noted: 2024-12-20

## 2025-03-06 NOTE — TELEPHONE ENCOUNTER
Spoke with pt about her missing her appointment with Dr. Rodríguez today and she stated that she was on her way and her car started over heating.  When asked if she was on her way she stated that she wasn't sure that she could be seen so she went on to the auto place.  I advised her that we needed to get her in to be seen since we have not seen her since her initial ob visit.  Pt has been scheduled for an ob follow-up on 3/10/25 with Dr. Rodríguez.

## 2025-03-10 ENCOUNTER — ROUTINE PRENATAL (OUTPATIENT)
Dept: OBSTETRICS AND GYNECOLOGY | Facility: CLINIC | Age: 20
End: 2025-03-10
Payer: COMMERCIAL

## 2025-03-10 VITALS — WEIGHT: 169 LBS | DIASTOLIC BLOOD PRESSURE: 78 MMHG | BODY MASS INDEX: 27.28 KG/M2 | SYSTOLIC BLOOD PRESSURE: 120 MMHG

## 2025-03-10 DIAGNOSIS — R82.90 CONTAMINATION OF URINE CULTURE: ICD-10-CM

## 2025-03-10 DIAGNOSIS — O09.899 SHORT INTERVAL BETWEEN PREGNANCIES AFFECTING PREGNANCY, ANTEPARTUM: ICD-10-CM

## 2025-03-10 DIAGNOSIS — O09.893 HISTORY OF PRETERM DELIVERY, CURRENTLY PREGNANT IN THIRD TRIMESTER: ICD-10-CM

## 2025-03-10 DIAGNOSIS — O26.849 UTERINE SIZE DATE DISCREPANCY PREGNANCY: ICD-10-CM

## 2025-03-10 DIAGNOSIS — Z34.83 PRENATAL CARE, SUBSEQUENT PREGNANCY IN THIRD TRIMESTER: Primary | ICD-10-CM

## 2025-03-10 LAB
C TRACH RRNA SPEC DONR QL NAA+PROBE: NEGATIVE
N GONORRHOEA DNA SPEC QL NAA+PROBE: NEGATIVE

## 2025-03-10 PROCEDURE — 87086 URINE CULTURE/COLONY COUNT: CPT | Performed by: OBSTETRICS & GYNECOLOGY

## 2025-03-10 RX ORDER — PROGESTERONE 200 MG/1
CAPSULE ORAL
Qty: 90 CAPSULE | Refills: 1 | Status: SHIPPED | OUTPATIENT
Start: 2025-03-10

## 2025-03-10 NOTE — PROGRESS NOTES
Chief Complaint   Patient presents with    Routine Prenatal Visit     No c/c       HPI: Mallika is a  currently at 30w1d who today reports the following:  Nausea - No; Vaginal bleeding -  No; Heartburn - No.    She has not been seen since her initial OB visit at 18 weeks.  She reports having issues with health insurance.  She has not taken the Prometrium    ROS:  GI: Constipation - No; Diarrhea - No    Neuro: Headache - No; Visual change - No      EXAM:  Vitals: See prenatal flowsheet   Abdomen: See prenatal flowsheet   Urine glucose/protein: See prenatal flowsheet   Pelvic: See prenatal flowsheet     Prenatal Labs  Lab Results   Component Value Date    HGB 12.9 12/15/2024    RUBELLAABIGG 1.35 02/15/2024    HEPBSAG Negative 02/15/2024    ABSCRN Negative 02/15/2024    PJJ2EDK6 Non Reactive 02/15/2024    HEPCVIRUSABY Non Reactive 02/15/2024     2024    URINECX >100,000 CFU/mL Lactobacillus species (A) 2024    CHLAMNAA Negative 02/15/2024    NGONORRHON Negative 02/15/2024       MDM:  Impression: Supervision of high risk pregnancy  Short interval pregnancy   Insufficient prenatal care   Hx of  delivery   Size less than dates by FH of 26 cm today    Tests done today: none   Topics discussed: Continue with PNV's  Prenatal labs reviewed  Flu vaccine recommended at any gestational age   labor signs and symptoms  Symptoms of preeclampsia  TDAP vaccination recommended between 27-36 weeks gestation OR after birth  Fetal movement counts  New Rx for prometrium sent to pharmacy    Tests scheduled today for her next visit:   U/S for anatomic screening  U/S for EFW     New Medications Ordered This Visit   Medications    Progesterone (Prometrium) 200 MG capsule     Sig: Insert one capsule per vagina at night     Dispense:  90 capsule     Refill:  1     Orders Placed This Encounter   Procedures    OneSwab - Swab, Urine, Clean Catch     Standing Status:   Future     Number of Occurrences:   1      Expected Date:   3/10/2025     Expiration Date:   3/10/2026     Release to patient:   Routine Release [[id number]]    Urine Culture - Urine, Urine, Clean Catch     Standing Status:   Future     Number of Occurrences:   1     Expected Date:   3/10/2025     Expiration Date:   3/10/2026     Release to patient:   Routine Release [[id number]]    US Ob Follow Up Transabdominal Approach     Standing Status:   Future     Expected Date:   3/20/2025     Expiration Date:   3/10/2026     Reason for Exam::   check EFW, size less than dates     Release to patient:   Routine Release [[id number]]    US Ob 14 + Weeks Single or First Gestation     Standing Status:   Future     Number of Occurrences:   1     Expected Date:   3/20/2025     Expiration Date:   3/10/2026     Reason for Exam::   insufficient prenatal care     Release to patient:   Routine Release [[id number]]    Glucose, Post 50 Gm Glucola     Standing Status:   Future     Expected Date:   4/10/2025     Expiration Date:   3/10/2026     Release to patient:   Routine Release [[id number]]

## 2025-03-11 LAB — BACTERIA SPEC AEROBE CULT: NO GROWTH

## 2025-03-14 ENCOUNTER — DOCUMENTATION (OUTPATIENT)
Dept: OBSTETRICS AND GYNECOLOGY | Facility: CLINIC | Age: 20
End: 2025-03-14
Payer: COMMERCIAL

## 2025-03-21 ENCOUNTER — ROUTINE PRENATAL (OUTPATIENT)
Dept: OBSTETRICS AND GYNECOLOGY | Facility: CLINIC | Age: 20
End: 2025-03-21
Payer: COMMERCIAL

## 2025-03-21 ENCOUNTER — LAB (OUTPATIENT)
Dept: LAB | Facility: HOSPITAL | Age: 20
End: 2025-03-21
Payer: COMMERCIAL

## 2025-03-21 VITALS — WEIGHT: 171 LBS | BODY MASS INDEX: 27.6 KG/M2 | DIASTOLIC BLOOD PRESSURE: 70 MMHG | SYSTOLIC BLOOD PRESSURE: 110 MMHG

## 2025-03-21 DIAGNOSIS — Z34.83 PRENATAL CARE, SUBSEQUENT PREGNANCY IN THIRD TRIMESTER: Primary | ICD-10-CM

## 2025-03-21 DIAGNOSIS — Z34.82 PRENATAL CARE, SUBSEQUENT PREGNANCY IN SECOND TRIMESTER: ICD-10-CM

## 2025-03-21 DIAGNOSIS — O09.893 HISTORY OF PRETERM DELIVERY, CURRENTLY PREGNANT IN THIRD TRIMESTER: ICD-10-CM

## 2025-03-21 DIAGNOSIS — Z34.83 PRENATAL CARE, SUBSEQUENT PREGNANCY IN THIRD TRIMESTER: ICD-10-CM

## 2025-03-21 DIAGNOSIS — Z34.83 PRENATAL CARE, SUBSEQUENT PREGNANCY, THIRD TRIMESTER: Primary | ICD-10-CM

## 2025-03-21 DIAGNOSIS — O09.899 SHORT INTERVAL BETWEEN PREGNANCIES AFFECTING PREGNANCY, ANTEPARTUM: ICD-10-CM

## 2025-03-21 DIAGNOSIS — O35.HXX0 SHORT FEMUR OF FETUS ON PRENATAL ULTRASOUND: ICD-10-CM

## 2025-03-21 LAB
ABO GROUP BLD: NORMAL
BASOPHILS # BLD AUTO: 0.02 10*3/MM3 (ref 0–0.2)
BASOPHILS NFR BLD AUTO: 0.3 % (ref 0–1.5)
BLD GP AB SCN SERPL QL: NEGATIVE
DEPRECATED RDW RBC AUTO: 39.2 FL (ref 37–54)
EOSINOPHIL # BLD AUTO: 0.05 10*3/MM3 (ref 0–0.4)
EOSINOPHIL NFR BLD AUTO: 0.9 % (ref 0.3–6.2)
ERYTHROCYTE [DISTWIDTH] IN BLOOD BY AUTOMATED COUNT: 13 % (ref 12.3–15.4)
EXTERNAL NIPT: NORMAL
GLUCOSE 1H P 100 G GLC PO SERPL-MCNC: 125 MG/DL (ref 65–139)
HBV SURFACE AG SERPL QL IA: NORMAL
HCT VFR BLD AUTO: 34.4 % (ref 34–46.6)
HCV AB SER QL: NORMAL
HGB BLD-MCNC: 11.7 G/DL (ref 12–15.9)
HIV 1+2 AB+HIV1 P24 AG SERPL QL IA: NORMAL
IMM GRANULOCYTES # BLD AUTO: 0.02 10*3/MM3 (ref 0–0.05)
IMM GRANULOCYTES NFR BLD AUTO: 0.3 % (ref 0–0.5)
LYMPHOCYTES # BLD AUTO: 1.42 10*3/MM3 (ref 0.7–3.1)
LYMPHOCYTES NFR BLD AUTO: 24.4 % (ref 19.6–45.3)
MCH RBC QN AUTO: 28.6 PG (ref 26.6–33)
MCHC RBC AUTO-ENTMCNC: 34 G/DL (ref 31.5–35.7)
MCV RBC AUTO: 84.1 FL (ref 79–97)
MONOCYTES # BLD AUTO: 0.29 10*3/MM3 (ref 0.1–0.9)
MONOCYTES NFR BLD AUTO: 5 % (ref 5–12)
NEUTROPHILS NFR BLD AUTO: 4.03 10*3/MM3 (ref 1.7–7)
NEUTROPHILS NFR BLD AUTO: 69.1 % (ref 42.7–76)
NRBC BLD AUTO-RTO: 0 /100 WBC (ref 0–0.2)
PLATELET # BLD AUTO: 198 10*3/MM3 (ref 140–450)
PMV BLD AUTO: 10.3 FL (ref 6–12)
RBC # BLD AUTO: 4.09 10*6/MM3 (ref 3.77–5.28)
RH BLD: POSITIVE
RPR SER QL: NORMAL
WBC NRBC COR # BLD AUTO: 5.83 10*3/MM3 (ref 3.4–10.8)

## 2025-03-21 PROCEDURE — 82950 GLUCOSE TEST: CPT

## 2025-03-21 PROCEDURE — 82948 REAGENT STRIP/BLOOD GLUCOSE: CPT

## 2025-03-21 PROCEDURE — 86803 HEPATITIS C AB TEST: CPT

## 2025-03-21 PROCEDURE — 80081 OBSTETRIC PANEL INC HIV TSTG: CPT

## 2025-03-21 NOTE — PROGRESS NOTES
Chief Complaint   Patient presents with    Routine Prenatal Visit     No c/c       HPI: Mallika is a  currently at 31w5d who today reports the following:  Contractions - No; Leaking - No; Vaginal bleeding -  No; Swelling of extremities - No.    She has questions about the length of her cervix during ultrasound last time.     ROS:  GI: Nausea - No; Constipation - No; Diarrhea - No    Neuro: Headache - No; Visual change - No      EXAM:  Vitals: See prenatal flowsheet   Abdomen: See prenatal flowsheet   Urine glucose/protein: See prenatal flowsheet   Pelvic: See prenatal flowsheet   MDM:   Impression: Supervision of high risk pregnancy  Short interval pregnancy   Insufficient prenatal care   Hx of  delivery    Tests done today: Urine dip for protein and glucose   Topics discussed: Continue with PNV's  Prenatal labs reviewed  Flu vaccine recommended at any gestational age   labor signs and symptoms  Symptoms of preeclampsia  TDAP vaccination recommended between 27-36 weeks gestation OR after birth  Reviewed importance of completing OB labs today including GCT  Reviewed given > 28 weeks the CL would mean less that time and images overall looked reassuring to me. Reviewed rx prometrium from last visit given her history. Will see if PDC can add on visit given FL at 1%.    Tests scheduled today for her next visit:   OB labs with GCT   U/S for EFW      Orders Placed This Encounter   Procedures    [facility]     Standing Status:   Future     Expected Date:   3/28/2025     Expiration Date:   3/21/2026     Reason for Exam::   hx of  labor, insufficient prenatal care, FL at 1%, CL     Release to patient:   Routine Release [[id number]]

## 2025-03-22 LAB — RUBV IGG SERPL IA-ACNC: 1.29 INDEX

## 2025-03-23 ENCOUNTER — HOSPITAL ENCOUNTER (OUTPATIENT)
Facility: HOSPITAL | Age: 20
Discharge: HOME | End: 2025-03-23
Attending: OBSTETRICS & GYNECOLOGY | Admitting: OBSTETRICS & GYNECOLOGY
Payer: COMMERCIAL

## 2025-03-23 VITALS
SYSTOLIC BLOOD PRESSURE: 104 MMHG | HEART RATE: 89 BPM | RESPIRATION RATE: 16 BRPM | DIASTOLIC BLOOD PRESSURE: 72 MMHG | TEMPERATURE: 97.9 F

## 2025-03-23 PROBLEM — O47.03 FALSE LABOR BEFORE 37 COMPLETED WEEKS OF GESTATION DURING PREGNANCY IN THIRD TRIMESTER, ANTEPARTUM: Status: ACTIVE | Noted: 2025-03-23

## 2025-03-23 LAB
BACTERIA UR QL AUTO: ABNORMAL /HPF
BACTERIA UR QL AUTO: ABNORMAL /HPF
BILIRUB UR QL STRIP: NEGATIVE
BILIRUB UR QL STRIP: NEGATIVE
CLARITY UR: ABNORMAL
CLARITY UR: CLEAR
COLOR UR: YELLOW
COLOR UR: YELLOW
GLUCOSE UR STRIP-MCNC: NEGATIVE MG/DL
GLUCOSE UR STRIP-MCNC: NEGATIVE MG/DL
HGB UR QL STRIP.AUTO: NEGATIVE
HGB UR QL STRIP.AUTO: NEGATIVE
HYALINE CASTS UR QL AUTO: ABNORMAL /LPF
HYALINE CASTS UR QL AUTO: ABNORMAL /LPF
KETONES UR QL STRIP: NEGATIVE
KETONES UR QL STRIP: NEGATIVE
LEUKOCYTE ESTERASE UR QL STRIP.AUTO: ABNORMAL
LEUKOCYTE ESTERASE UR QL STRIP.AUTO: ABNORMAL
NITRITE UR QL STRIP: NEGATIVE
NITRITE UR QL STRIP: NEGATIVE
PH UR STRIP.AUTO: 5.5 [PH] (ref 5–8)
PH UR STRIP.AUTO: 6.5 [PH] (ref 5–8)
POC AMNISURE: NEGATIVE
PROT UR QL STRIP: ABNORMAL
PROT UR QL STRIP: NEGATIVE
RBC # UR STRIP: ABNORMAL /HPF
RBC # UR STRIP: ABNORMAL /HPF
REF LAB TEST METHOD: ABNORMAL
REF LAB TEST METHOD: ABNORMAL
RENAL EPI CELLS #/AREA URNS HPF: ABNORMAL /HPF
SP GR UR STRIP: 1.02 (ref 1–1.03)
SP GR UR STRIP: 1.02 (ref 1–1.03)
SQUAMOUS #/AREA URNS HPF: ABNORMAL /HPF
SQUAMOUS #/AREA URNS HPF: ABNORMAL /HPF
UROBILINOGEN UR QL STRIP: ABNORMAL
UROBILINOGEN UR QL STRIP: ABNORMAL
WBC # UR STRIP: ABNORMAL /HPF
WBC # UR STRIP: ABNORMAL /HPF

## 2025-03-23 PROCEDURE — 59025 FETAL NON-STRESS TEST: CPT | Performed by: OBSTETRICS & GYNECOLOGY

## 2025-03-23 PROCEDURE — 84112 EVAL AMNIOTIC FLUID PROTEIN: CPT | Performed by: OBSTETRICS & GYNECOLOGY

## 2025-03-23 PROCEDURE — 99213 OFFICE O/P EST LOW 20 MIN: CPT | Performed by: OBSTETRICS & GYNECOLOGY

## 2025-03-23 PROCEDURE — G0463 HOSPITAL OUTPT CLINIC VISIT: HCPCS

## 2025-03-23 PROCEDURE — G0378 HOSPITAL OBSERVATION PER HR: HCPCS

## 2025-03-23 PROCEDURE — 81001 URINALYSIS AUTO W/SCOPE: CPT | Performed by: OBSTETRICS & GYNECOLOGY

## 2025-03-23 PROCEDURE — 59025 FETAL NON-STRESS TEST: CPT

## 2025-03-24 NOTE — H&P
Mallika Kimble  2005  [id number]  [id number]    CC: leaking  HPI:  Patient is 20 y.o. female   currently at 32w0d presents with c/o poss SROM.  Pt has had mult episodes small amts leaking since last night.  Pt denies vag bleeding.  ?contractions.  No recent intercourse.  PNC comp by hx  delivery at 32 weeks for PPROM and  delivery.  Good FM     PMH:  Current meds: PNV  Illnesses: seizures (last seizure age 8.  Pt was never on seizure meds)  Surgeries: lap appy, T and A  Allergies: NKDA    Past OB History:       OB History    Para Term  AB Living   3 2 1 1 0 2   SAB IAB Ectopic Molar Multiple Live Births   0 0 0 0 0 2      # Outcome Date GA Lbr Star/2nd Weight Sex Type Anes PTL Lv   3 Current            2  24 32w3d  2041 g (4 lb 8 oz) M Vag-Spont  Y KELLY   1 Term 23 37w3d  3317 g (7 lb 5 oz) M Vag-Spont   KELLY      Birth Comments: PROM      Obstetric Comments   G1 delivered at [facility] in [place], [place]    - [facility]    - [facility]      No history of abnormal paps or STIs            SH: tob neg , EtOH neg, drugs neg      General ROS: leaking.   All other systems reviewed and are negative.      Physical Examination: General appearance - alert, well appearing, and in no distress  Vital signs - /72   Pulse 89   Temp 97.9 °F (36.6 °C) (Oral)   Resp 16   LMP 10/10/2023 (Approximate)   HEENT: normocephalic, atraumatic,oropharynx clear, appearance of ears and nose normal  Neck - supple, no significant adenopathy, no thyromegaly  Lymphatics - no palpable lymphadenopathy in the neck or groin, no hepatosplenomegaly  Chest - clear to auscultation, no wheezes, rales or rhonchi, respiratory effort non-labored  Heart - normal rate, regular rhythm, no murmurs, rubs, clicks or gallops, no JVD, no lower extremity edema  Abdomen - soft, nontender, nondistended, no masses, no hepatosplenomegaly  no rebound tenderness noted, bowel sounds  normal  Vaginal Exam: 1/70%/-2, amnisure neg, no blood in vault,external genitalia normal  Extremities - no pedal edema noted, no calf tenderness  Skin -warm and dry, normal coloration and turgor, no rashes, no suspicious skin lesions noted    Fetal monitoring: indication leaking, onset , offset , baseline 130, mod BTB variability, multiple accels (15 X 15), no decels, rare contractions, interpretation reactive NST    Radiology     Assessment 1)IUP 32 0/7 weeks   2)false labor   3)hx  delivery    Plan 1)observe   2)home    Stew Rooney MD  3/23/2025  21:28 EDT

## 2025-03-25 DIAGNOSIS — Z34.83 PRENATAL CARE, SUBSEQUENT PREGNANCY IN THIRD TRIMESTER: Primary | ICD-10-CM

## 2025-03-27 ENCOUNTER — RESULTS FOLLOW-UP (OUTPATIENT)
Dept: OBSTETRICS AND GYNECOLOGY | Facility: CLINIC | Age: 20
End: 2025-03-27
Payer: COMMERCIAL

## 2025-03-27 ENCOUNTER — DOCUMENTATION (OUTPATIENT)
Dept: OBSTETRICS AND GYNECOLOGY | Facility: CLINIC | Age: 20
End: 2025-03-27
Payer: COMMERCIAL

## 2025-04-09 LAB — SCAN RESULT: NORMAL
